# Patient Record
Sex: MALE | Race: WHITE
[De-identification: names, ages, dates, MRNs, and addresses within clinical notes are randomized per-mention and may not be internally consistent; named-entity substitution may affect disease eponyms.]

---

## 2022-03-01 PROBLEM — Z00.00 ENCOUNTER FOR PREVENTIVE HEALTH EXAMINATION: Status: ACTIVE | Noted: 2022-03-01

## 2022-03-18 ENCOUNTER — APPOINTMENT (OUTPATIENT)
Dept: INTERNAL MEDICINE | Facility: CLINIC | Age: 22
End: 2022-03-18

## 2024-02-15 ENCOUNTER — NON-APPOINTMENT (OUTPATIENT)
Age: 24
End: 2024-02-15

## 2024-02-15 ENCOUNTER — APPOINTMENT (OUTPATIENT)
Dept: ORTHOPEDIC SURGERY | Facility: CLINIC | Age: 24
End: 2024-02-15
Payer: COMMERCIAL

## 2024-02-15 VITALS — HEIGHT: 73 IN | BODY MASS INDEX: 23.86 KG/M2 | WEIGHT: 180 LBS

## 2024-02-15 DIAGNOSIS — Z78.9 OTHER SPECIFIED HEALTH STATUS: ICD-10-CM

## 2024-02-15 PROCEDURE — L4361: CPT | Mod: LT

## 2024-02-15 PROCEDURE — 99204 OFFICE O/P NEW MOD 45 MIN: CPT | Mod: 57

## 2024-02-15 PROCEDURE — 27786 TREATMENT OF ANKLE FRACTURE: CPT | Mod: LT

## 2024-02-15 NOTE — HISTORY OF PRESENT ILLNESS
[Sudden] : sudden [de-identified] : Injured left ankle slipped on ice 2/13/24. Walked on it at work afterward. Has pain and swelling. xray'd and splinted at urgent care [] : no [FreeTextEntry1] : left ankle  [FreeTextEntry3] : 2/13/24 [FreeTextEntry5] : patient was injured after slipping on ice  [de-identified] : [facility]

## 2024-02-15 NOTE — DATA REVIEWED
[Outside X-rays] : outside x-rays [Left] : left [Ankle] : ankle [I reviewed the films/CD and agree] : I reviewed the films/CD and agree [FreeTextEntry1] : spiral fx distal fibula, acceptable alignment, talus maintained in mortise

## 2024-02-15 NOTE — ASSESSMENT
[FreeTextEntry1] : Will immobilize in tall CAM boot. Doesn't need surgery at this point  Should limit weight bearing with crutches. f/u xray in 2 weeks

## 2024-03-01 ENCOUNTER — APPOINTMENT (OUTPATIENT)
Dept: ORTHOPEDIC SURGERY | Facility: CLINIC | Age: 24
End: 2024-03-01
Payer: COMMERCIAL

## 2024-03-01 VITALS — BODY MASS INDEX: 23.86 KG/M2 | HEIGHT: 73 IN | WEIGHT: 180 LBS

## 2024-03-01 PROCEDURE — 73610 X-RAY EXAM OF ANKLE: CPT | Mod: LT

## 2024-03-01 PROCEDURE — 99024 POSTOP FOLLOW-UP VISIT: CPT

## 2024-03-01 NOTE — HISTORY OF PRESENT ILLNESS
[Sudden] : sudden [de-identified] : Injured left ankle slipped on ice 2/13/24. Walked on it at work afterward. Has pain and swelling. xray'd and splinted at urgent care [FreeTextEntry1] : left ankle  [] : no [FreeTextEntry5] : patient was injured after slipping on ice  [de-identified] : [facility] [FreeTextEntry3] : 2/13/24

## 2024-03-29 ENCOUNTER — APPOINTMENT (OUTPATIENT)
Dept: ORTHOPEDIC SURGERY | Facility: CLINIC | Age: 24
End: 2024-03-29
Payer: COMMERCIAL

## 2024-03-29 VITALS — HEIGHT: 73 IN | BODY MASS INDEX: 23.86 KG/M2 | WEIGHT: 180 LBS

## 2024-03-29 DIAGNOSIS — S82.62XA DISPLACED FRACTURE OF LATERAL MALLEOLUS OF LEFT FIBULA, INITIAL ENCOUNTER FOR CLOSED FRACTURE: ICD-10-CM

## 2024-03-29 PROCEDURE — 99024 POSTOP FOLLOW-UP VISIT: CPT

## 2024-03-29 PROCEDURE — 73610 X-RAY EXAM OF ANKLE: CPT | Mod: LT

## 2024-03-29 NOTE — HISTORY OF PRESENT ILLNESS
[Sudden] : sudden [de-identified] : Injured left ankle slipped on ice 2/13/24. Walked on it at work afterward. Has pain and swelling. xray'd and splinted at urgent care [] : no [FreeTextEntry1] : left ankle  [FreeTextEntry3] : 2/13/24 [FreeTextEntry5] : patient was injured after slipping on ice  [de-identified] : [facility]

## 2024-03-29 NOTE — IMAGING
[Left] : left ankle [The fracture is in acceptable alignment. There is progression in healing seen] : The fracture is in acceptable alignment. There is progression in healing seen [FreeTextEntry9] : bridging callous noted

## 2024-03-29 NOTE — REASON FOR VISIT
[FreeTextEntry2] : 03/29/2024 approaching 6 weeks s/p fx left lateral mal, mild discomfort when weight bearing out of CAM 03/01/2024 over 2 weeks s/p fx left lateral mal, FWB in CAM

## 2024-04-26 ENCOUNTER — APPOINTMENT (OUTPATIENT)
Dept: ORTHOPEDIC SURGERY | Facility: CLINIC | Age: 24
End: 2024-04-26